# Patient Record
(demographics unavailable — no encounter records)

---

## 2018-08-27 NOTE — MMO
BILATERAL SCREENING MAMMOGRAM: 

 

Date:   08/24/18 

 

HISTORY:  

Screening.

 

COMPARISON:  

Mammograms from 2017 and 2015. 

 

TECHNIQUE:  

Bilateral screening CC and MLO mammograms. 

 

This patient's mammogram was interpreted with the assistance of computer-aided detection.  

 

FINDINGS:

The breasts are extremely dense, which limits the sensitivity of mammography. No suspicious mass, arc
hitectural distortion, or microcalcifications. 

 

IMPRESSION: 

 

BIRADS 1:  Negative

 

Continued annual mammographic screening is recommended. 

 

POS: [facility]